# Patient Record
Sex: FEMALE | Race: WHITE | NOT HISPANIC OR LATINO | URBAN - METROPOLITAN AREA
[De-identification: names, ages, dates, MRNs, and addresses within clinical notes are randomized per-mention and may not be internally consistent; named-entity substitution may affect disease eponyms.]

---

## 2017-01-27 ENCOUNTER — ALLSCRIPTS OFFICE VISIT (OUTPATIENT)
Dept: OTHER | Facility: OTHER | Age: 62
End: 2017-01-27

## 2017-01-27 ENCOUNTER — GENERIC CONVERSION - ENCOUNTER (OUTPATIENT)
Dept: OTHER | Facility: OTHER | Age: 62
End: 2017-01-27

## 2017-01-27 DIAGNOSIS — Z12.31 ENCOUNTER FOR SCREENING MAMMOGRAM FOR MALIGNANT NEOPLASM OF BREAST: ICD-10-CM

## 2017-03-15 ENCOUNTER — ALLSCRIPTS OFFICE VISIT (OUTPATIENT)
Dept: OTHER | Facility: OTHER | Age: 62
End: 2017-03-15

## 2017-06-30 ENCOUNTER — ALLSCRIPTS OFFICE VISIT (OUTPATIENT)
Dept: OTHER | Facility: OTHER | Age: 62
End: 2017-06-30

## 2017-08-17 ENCOUNTER — GENERIC CONVERSION - ENCOUNTER (OUTPATIENT)
Dept: OTHER | Facility: OTHER | Age: 62
End: 2017-08-17

## 2017-08-18 DIAGNOSIS — R92.8 OTHER ABNORMAL AND INCONCLUSIVE FINDINGS ON DIAGNOSTIC IMAGING OF BREAST: ICD-10-CM

## 2017-08-23 ENCOUNTER — GENERIC CONVERSION - ENCOUNTER (OUTPATIENT)
Dept: OTHER | Facility: OTHER | Age: 62
End: 2017-08-23

## 2017-12-29 ENCOUNTER — ALLSCRIPTS OFFICE VISIT (OUTPATIENT)
Dept: OTHER | Facility: OTHER | Age: 62
End: 2017-12-29

## 2017-12-29 ENCOUNTER — TRANSCRIBE ORDERS (OUTPATIENT)
Dept: ADMINISTRATIVE | Facility: HOSPITAL | Age: 62
End: 2017-12-29

## 2017-12-29 DIAGNOSIS — I42.0 CONGESTIVE CARDIOMYOPATHY (HCC): ICD-10-CM

## 2017-12-29 DIAGNOSIS — I50.22 CHRONIC SYSTOLIC HEART FAILURE (HCC): Primary | ICD-10-CM

## 2017-12-30 NOTE — PROGRESS NOTES
Assessment   Assessed    1  Family history of coronary artery disease (V17 3) (Z82 49) : Mother, Father   2  Family history of diabetes mellitus (V18 0) (Z83 3) : Mother   3  Dilated cardiomyopathy (425 4) (I42 0)    Plan   Chronic systolic congestive heart failure, Dilated cardiomyopathy    · EKG/ECG- POC; Status:Complete;   Done: 53BNQ4040 03:00PM   Perform: In Office; Last Updated Asad Waite; 2017 3:05:42 PM;Ordered; For:Chronic systolic congestive heart failure, Dilated cardiomyopathy; Ordered By:Adrian Rebolledo;  Dilated cardiomyopathy    · Follow-up visit in 1 month Evaluation and Treatment  Follow-up  Status: Hold For -    Scheduling  Requested for: 19Lzl6862   Ordered; For: Dilated cardiomyopathy; Ordered By: Luz Marina Berry Performed:  Due: 20YWK7563   · (1) [place]; Status:Active; Requested ROT:[id number]; Perform:[facility]; [id number];YFOOYFQ; For:Dilated cardiomyopathy; Ordered By:Adrian Rebolledo;   · (1) NT- BNP (PRO BRAIN NATRIURETIC PEPTIDE); Status:Active; Requested    DTN:65YGW7395; Perform:[facility]; VMK:71MWU1441;QJGZJIQ; For:Dilated cardiomyopathy; Ordered By:Adrian Rebolledo;   · ECHO FOLLOW UP/LIMITED; Status:Need Information - Financial Authorization; Requested for:77Itq2398;    Perform:[facility]; FWH:61ICF9181; Ordered; For:Dilated cardiomyopathy; Ordered By:Adrian Rebolledo; Obesity    · (1) LIPID PANEL FASTING W DIRECT LDL REFLEX; Status:Active; Requested    QTI:86PVW1459; Perform:[facility]; TYN:88RUL3915;RBMPNUY;EJY:THZAYBE; Ordered By:Adrian Rebolledo;    Discussion/Summary   Cardiology Discussion Summary Free Text Note Form [facility]:    1  History of dilated cardiomyopathy: Last echocardiogram in  with ejection fraction 50%  Will recheck echocardiogram to ensure stability of left ventricular systolic function well on current medications  Will check BMP and NT proBNP for evaluation while on current medications  Encouraged regular exercise regimen, weight loss  Hypertension: Well controlled, continue heart medications   Obesity: BMI 39 5  Explained importance of weight loss, exercise, heart healthy diet, as this may be a factor in causing her exertional dyspnea  She has an elliptical at home which have encouraged her to use, slowly, and progress as able  in 1 month after blood work, with echocardiogram that will be limited to evaluate for left ventricular systolic function  Chief Complaint   Chief Complaint Free Text Note Form: Dilated cardiomyopathy      History of Present Illness   Cardiology HPI Free Text Note Form  Damaris Rivero: This is a 82-year-old female with a significant history of nonischemic dilated cardiomyopathy dating back to 2011, at which time her ejection fraction was 25%, for which she has followed Dr Patria Huitron in the past  She has been maintained on lisinopril, carvedilol, potassium, and furosemide for her cardiomyopathy  Prior echocardiogram December 2012 revealed an ejection fraction of 50% with mild concentric hypertrophy  presents today for follow-up with no complaints some exertional dyspnea with over exertion  She is not sure if this is related to her age and weight, or symptom pathologic  She does not exercise, and is compliant with her medications  She has not had any recent blood work  Review of Systems   Cardiology Female ROS:         Cardiac: no chest pain,-- no signs of swelling-- and-- no palpitations present  Respiratory: shortness of breath--   occasional when carryiing things up steps  Neurological: no dizziness             ROS Reviewed:    ROS reviewed  Active Problems   Problems    1  Abnormal finding on breast imaging (793 89) (R92 8)   2  Acute Medial Meniscus Tear (836 0)   3  Acute upper respiratory infection (465 9) (J06 9)   4  Allergic rhinitis (477 9) (J30 9)   5  Breast cyst (610 0) (N60 09)   6   Chronic systolic congestive heart failure (428 22,428 0) (I50 22)   7  Dental abscess (522 5) (K04 7)   8  Dilated cardiomyopathy (425 4) (I42 0)   9  Encounter for screening mammogram for breast cancer (V76 12) (Z12 31)   10  Esophageal reflux (530 81) (K21 9)   11  Herniated nucleus pulposus, L4-5 (722 10) (M51 26)   12  History of allergy (V15 09) (Z88 9)   13  Neck muscle spasm (728 85) (M62 838)   14  Pernicious anemia (281 0) (D51 0)   15  X-Ray UGI Ba Swallow Esophageal / Schatzki's Ring (750 3)    Past Medical History   Problems    1  History of Acute maxillary sinusitis (461 0) (J01 00)   2  History of Acute URI (465 9) (J06 9)   3  History of Cellulitis of leg, left (682 6) (L03 116)   4  History of Community acquired pneumonia (5) (J18 9)   5  History of Dysphagia (787 20) (R13 10)   6  History of acute bronchitis (V12 69) (Z87 09)   7  History of hiatal hernia (V12 79) (Z87 19)   8  History of Urinary tract infection (599 0) (N39 0)  Active Problems And Past Medical History Reviewed: The active problems and past medical history were reviewed and updated today  Surgical History   Problems    1  History of Laminectomy Lumbar   2  History of Tonsillectomy  Surgical History Reviewed: The surgical history was reviewed and updated today  Family History   Mother    1  Family history of coronary artery disease (V17 3) (Z82 49)   2  Family history of diabetes mellitus (V18 0) (Z83 3)  Father    3  Family history of coronary artery disease (V17 3) (Z82 49)  Family History Reviewed: The family history was reviewed and updated today  Social History   Problems    · Being A Social Drinker   · Denied: History of Drug Use   · Marital History - Engaged To Be    · Never a smoker   · Uses Safety Equipment - Seatbelts  Social History Reviewed: The social history was reviewed and updated today  Current Meds    1  Aspirin 81 MG TABS; TAKE 1 TABLET DAILY; Therapy: 52DAN1008 to (Last Rx:29Nov2011)  Requested for: 29Nov2011 Ordered   2  Carvedilol 3 125 MG Oral Tablet; TAKE 1 TABLET BY MOUTH TWO TIMES DAILY; Therapy: 92UIN8858 to (Evaluate:03Jan2018)  Requested for: 08YVP5338; Last     Rx:04Dec2017 Ordered   3  Escitalopram Oxalate 5 MG Oral Tablet; TAKE 1 TABLET DAILY; Therapy: 83PMZ6278 to (Nereyda Mae)  Requested for: 75JRN0210; Last     Rx:27Jan2017 Ordered   4  Fluticasone Propionate 50 MCG/ACT Nasal Suspension; USE 2 SPRAYS IN EACH     NOSTRIL ONCE DAILY; Therapy: 37APY5115 to (Evaluate:08Sep2017)  Requested for: 23DIC9367; Last     Rx:30Jun2017 Ordered   5  Furosemide 40 MG Oral Tablet; take 1 tablet by mouth every day; Therapy: 12EED5814 to (Evaluate:03Jan2018)  Requested for: 25ELW7670; Last     Rx:04Dec2017 Ordered   6  Klor-Con 10 10 MEQ Oral Tablet Extended Release; One tablet daily; Therapy: 79OHQ7977 to (Evaluate:09Oct2017)  Requested for: 97NJM4367; Last     Rx:42Pav4228 Ordered   7  Lisinopril 10 MG Oral Tablet; take 1 tablet by mouth every day; Therapy: 80JEF6046 to (Evaluate:03Jan2018)  Requested for: 72CVU6314; Last     Rx:41Ldp7560 Ordered   8  Omeprazole 40 MG Oral Capsule Delayed Release; TAKE 1 CAPSULE BY MOUTH     EVERY DAY; Therapy: 45ZNE0115 to (Keerthi Aguirre)  Requested for: 98LYF6745; Last     Rx:27Jan2017 Ordered   9  Promethazine-Codeine 6 25-10 MG/5ML Oral Syrup; TAKE 5 ML BY MOUTH AT BEDTIME     AS NEEDED; Therapy: 69VQX2072 to (Evaluate:87Xop1530); Last Rx:30Jun2017 Ordered   10  TraMADol HCl - 50 MG Oral Tablet; TAKE 1 TABLET 4 TIMES DAILY AS NEEDED FOR      PAIN;      Therapy: 47UMX8616 to (Evaluate:19Mar2017); Last Rx:15Mar2017 Ordered  Medication List Reviewed: The medication list was reviewed and updated today         Allergies   Medication    1  barium sulfate    Vitals   Vital Signs    Recorded: 29Dec2017 03:06PM   Heart Rate 92, Apical   Pulse Quality Regular, Apical   Respiration 16   Systolic 659, LUE, Sitting   Diastolic 70, LUE, Sitting   Height 5 ft 1 in Weight 209 lb 2 oz   BMI Calculated 39 51   BSA Calculated 1 93     Physical Exam        Constitutional - General appearance: No acute distress, well appearing and well nourished  Eyes - Conjunctiva and Sclera examination: Conjunctiva pink, sclera anicteric  Neck - Normal, no JVD   Pulmonary - Respiratory effort: No signs of respiratory distress  -- Auscultation of lungs: Clear to auscultation  Cardiovascular - Auscultation of heart: Normal rate and rhythm, normal S1 and S2, no murmurs  -- Pedal pulses: Normal, 2+ bilaterally  -- Examination of extremities for edema and/or varicosities: Normal        Abdomen - Soft  Musculoskeletal - Gait and station: Normal gait  Skin - Skin: Normal without rashes  Skin is warm and well perfused  Neurologic - Speech normal  No focal deficits        Psychiatric - Orientation to person, place, and time: Normal       Results/Data   ECG Report: A 12 lead ECG was performed and was normal       Signatures    Electronically signed by : Rhonda Bowers DO; Dec 29 2017  3:36PM EST                       (Author)

## 2018-01-14 VITALS
DIASTOLIC BLOOD PRESSURE: 86 MMHG | WEIGHT: 212.38 LBS | SYSTOLIC BLOOD PRESSURE: 142 MMHG | TEMPERATURE: 98.9 F | HEART RATE: 76 BPM | BODY MASS INDEX: 40.1 KG/M2 | HEIGHT: 61 IN

## 2018-01-14 VITALS
RESPIRATION RATE: 16 BRPM | SYSTOLIC BLOOD PRESSURE: 103 MMHG | DIASTOLIC BLOOD PRESSURE: 90 MMHG | BODY MASS INDEX: 37.76 KG/M2 | HEIGHT: 61 IN | WEIGHT: 200 LBS | HEART RATE: 80 BPM

## 2018-01-14 VITALS
WEIGHT: 200.13 LBS | SYSTOLIC BLOOD PRESSURE: 126 MMHG | HEART RATE: 76 BPM | BODY MASS INDEX: 37.79 KG/M2 | HEIGHT: 61 IN | DIASTOLIC BLOOD PRESSURE: 84 MMHG

## 2018-01-14 NOTE — MISCELLANEOUS
Message   Recorded as Task   Date: 11/07/2016 08:23 AM, Created By: Edmundo   Task Name: Medical Complaint Callback   Assigned To: Buffy Oates   Regarding Patient: Kitty Lopez, Status: Active   Comment:    Buffy Oates - 07 Nov 2016 8:23 AM     TASK CREATED  Caller: Self; [phone] (Home)  Pt states that she is getting more congested  Has not taken her temp  She's sure she has a sinus infection and wants abx  Does not want to pay another copay  She gets this twice a year and you usually give her a zpak for it  YesyorrJean weinstein Jr - 07 Nov 2016 12:57 PM     TASK REPLIED TO: Previously Assigned To Maher Estefany          She can have a Z-[place]  Twice yearly "sinusitis" tends to be more consistent with recurrent allergic rhinitis, as sinus infections do not appear seasonally, which is why I try to hold off on antibiotics  Buffy Oates - 07 Nov 2016 5:37 PM     TASK EDITED                 lmovm for pt  Rx called to [facility]  Active Problems    1  Acute maxillary sinusitis (461 0) (J01 00)   2  Acute Medial Meniscus Tear (836 0)   3  Acute URI (465 9) (J06 9)   4  Allergic rhinitis (477 9) (J30 9)   5  Chronic systolic congestive heart failure (428 22,428 0) (I50 22)   6  Cough (786 2) (R05)   7  Dilated cardiomyopathy (425 4) (I42 0)   8  Esophageal reflux (530 81) (K21 9)   9  Fever (780 60) (R50 9)   10  Herniated nucleus pulposus, L4-5 (722 10) (M51 26)   11  History of allergy (V15 09) (Z88 9)   12  Pernicious anemia (281 0) (D51 0)   13  X-Ray UGI Ba Swallow Esophageal / Schatzki's Ring (750 3)    Current Meds   1  Aspirin 81 MG TABS; TAKE 1 TABLET DAILY; Therapy: 77WVP0418 to (Last Rx:29Nov2011)  Requested for: 29Nov2011 Ordered   2  Carvedilol 3 125 MG Oral Tablet; take 1 tablet by mouth twice daily; Therapy: 38KRT5693 to (Evaluate:90Cxd8301)  Requested for: 52DCC9389; Last   Rx:19May2016 Ordered   3   Fluticasone Propionate 50 MCG/ACT Nasal Suspension; USE 2 SPRAYS IN EACH   NOSTRIL ONCE DAILY; Therapy: 29TJT1375 to (Evaluate:14Nov2016)  Requested for: 48KZR3195; Last   Rx:04Nov2016 Ordered   4  Furosemide 40 MG Oral Tablet; TAKE 1 TABLET DAILY; Therapy: 24AQB2423 to (Evaluate:14May2017)  Requested for: 88FUN6656; Last   Rx:19May2016 Ordered   5  Klor-Con 10 10 MEQ Oral Tablet Extended Release; One tablet daily; Therapy: 55ZOI4757 to (Evaluate:14May2017)  Requested for: 58ZJQ1203; Last   Rx:19May2016 Ordered   6  Lisinopril 10 MG Oral Tablet; Take 1 tablet daily; Therapy: 49KSD9292 to (Evaluate:14May2017)  Requested for: 64HTR6334; Last   Rx:19May2016 Ordered   7  Omeprazole 40 MG Oral Capsule Delayed Release; TAKE 1 CAPSULE BY MOUTH   EVERY DAY; Therapy: 06GMJ3966 to (Evaluate:71Qxb1716)  Requested for: 10UZD8668; Last   Rx:29Jan2016 Ordered   8  ProAir  (90 Base) MCG/ACT Inhalation Aerosol Solution; INHALE 1 TO 2 PUFFS   EVERY 4 TO 6 HOURS AS NEEDED; Therapy: 17XFP5634 to (Evaluate:83Fzv0094)  Requested for: 35WRM4193; Last   Rx:04Nov2016 Ordered   9  Promethazine-Codeine 6 25-10 MG/5ML Oral Syrup; TAKE 5 ML BY MOUTH AT   BEDTIME AS NEEDED; Therapy: 20EJS4651 to (Evaluate:52Ltr9394);  Last Rx:04Nov2016 Ordered    Allergies    1  barium sulfate    Plan  Acute URI    · DrRx Zithromax Z-Delnao 250 MG #6 pill pack; as directed    Signatures   Electronically signed by : Ulysses Hotter, ; Nov 7 2016  5:37PM EST                       (Author)

## 2018-01-15 NOTE — MISCELLANEOUS
Message   Date: 27 Jan 2017 10:57 AM EST, Recorded By: SandroSrinivasa Anand   Phone: [phone] (Home)   PC from patient who calling with neck pain and anxiety  She states she woke up with neck pain this am, but it is relieved by Advil  She also states she slept on the sofa last night, and didn't sleep well  She was calling to ask if it was her heart? I asked her pertinent questions  Her /76, feels fine otherwise, no nausea, no dizziness,  no headache,  no arm or chest pain, no sweating, she is asymptomatic other than the stiff neck  She formerly saw Dr Livingston Lanes, who has retired and she is not due back until May 2017  She has not decided on a different doctor in our practice  I referred her to the ER if she feels it is an emergency, or to PCP for evaluation, to determine if it is heart related, muscular, or something else  She has decided to call her PCP, and go from there  She states she "is a worrier and gets herself all worked up over things "        Active Problems    1  Acute maxillary sinusitis (461 0) (J01 00)   2  Acute Medial Meniscus Tear (836 0)   3  Acute URI (465 9) (J06 9)   4  Allergic rhinitis (477 9) (J30 9)   5  Chronic systolic congestive heart failure (428 22,428 0) (I50 22)   6  Cough (786 2) (R05)   7  Dilated cardiomyopathy (425 4) (I42 0)   8  Esophageal reflux (530 81) (K21 9)   9  Fever (780 60) (R50 9)   10  Herniated nucleus pulposus, L4-5 (722 10) (M51 26)   11  History of allergy (V15 09) (Z88 9)   12  Pernicious anemia (281 0) (D51 0)   13  X-Ray UGI Ba Swallow Esophageal / Schatzki's Ring (750 3)    Current Meds   1  Aspirin 81 MG TABS; TAKE 1 TABLET DAILY; Therapy: 51WXG3863 to (Last Rx:29Nov2011)  Requested for: 29Nov2011 Ordered   2  Carvedilol 3 125 MG Oral Tablet; take 1 tablet by mouth twice daily; Therapy: 24GMZ8974 to (Evaluate:76Gfu0843)  Requested for: 21NDW0235; Last   Rx:72Prl0726 Ordered   3   DrRx Zithromax Z-[place] 250 MG #6 pill pack; as directed; Therapy: 60UII6837 to (Evaluate:13Nov2016); Last Rx:08Nov2016 Ordered   4  Fluticasone Propionate 50 MCG/ACT Nasal Suspension; USE 2 SPRAYS IN EACH   NOSTRIL ONCE DAILY; Therapy: 02MAT1128 to (Evaluate:14Nov2016)  Requested for: 42OOR1226; Last   Rx:04Nov2016 Ordered   5  Furosemide 40 MG Oral Tablet; TAKE 1 TABLET DAILY; Therapy: 32TKR2257 to (Evaluate:14May2017)  Requested for: 42RBT5157; Last   Rx:19May2016 Ordered   6  Klor-Con 10 10 MEQ Oral Tablet Extended Release; One tablet daily; Therapy: 26TPY3527 to (Evaluate:14May2017)  Requested for: 26NTX9579; Last   Rx:19May2016 Ordered   7  Lisinopril 10 MG Oral Tablet; Take 1 tablet daily; Therapy: 01TBO3681 to (Evaluate:29Tpc6865)  Requested for: 04UPH0979; Last   Rx:19May2016 Ordered   8  Omeprazole 40 MG Oral Capsule Delayed Release; TAKE 1 CAPSULE BY MOUTH   EVERY DAY; Therapy: 83TVR4908 to (Evaluate:44Pqs0360)  Requested for: 29TOX4258; Last   Rx:29Jan2016 Ordered   9  ProAir  (90 Base) MCG/ACT Inhalation Aerosol Solution; INHALE 1 TO 2 PUFFS   EVERY 4 TO 6 HOURS AS NEEDED; Therapy: 85QPS4260 to (Evaluate:21Bjb7430)  Requested for: 72FEJ2598; Last   Rx:04Nov2016 Ordered   10  Promethazine-Codeine 6 25-10 MG/5ML Oral Syrup; TAKE 5 ML BY MOUTH AT    BEDTIME AS NEEDED; Therapy: 52IYV7811 to (Evaluate:31Enf4909);  Last Rx:04Nov2016 Ordered    Allergies    1  barium sulfate    Signatures   Electronically signed by : Radha Daugherty, ; Jan 27 2017 11:09AM EST                       (Administrative)

## 2018-01-23 VITALS
RESPIRATION RATE: 16 BRPM | WEIGHT: 209.13 LBS | DIASTOLIC BLOOD PRESSURE: 70 MMHG | HEART RATE: 92 BPM | HEIGHT: 61 IN | BODY MASS INDEX: 39.48 KG/M2 | SYSTOLIC BLOOD PRESSURE: 110 MMHG

## 2018-01-24 DIAGNOSIS — E66.9 OBESITY: ICD-10-CM

## 2018-01-24 DIAGNOSIS — I42.0 DILATED CARDIOMYOPATHY (HCC): ICD-10-CM

## 2018-02-05 ENCOUNTER — TELEPHONE (OUTPATIENT)
Dept: CARDIOLOGY CLINIC | Facility: CLINIC | Age: 63
End: 2018-02-05

## 2018-02-05 NOTE — TELEPHONE ENCOUNTER
Received call from [facility] office stating they wanted diagnosis for patient   told dilated cardiomyopathy, obesity, HTN   the patient was found dead in her basement by her boyfriend and pronounced dead by EMS at scene she had gone downstairs to paint a bird house and did not come back up   JEFF[place]

## 2018-02-28 DIAGNOSIS — R92.8 OTHER ABNORMAL AND INCONCLUSIVE FINDINGS ON DIAGNOSTIC IMAGING OF BREAST: ICD-10-CM

## 2018-02-28 DIAGNOSIS — N60.09 SOLITARY CYST OF BREAST: ICD-10-CM
